# Patient Record
Sex: FEMALE | Race: WHITE | Employment: OTHER | URBAN - METROPOLITAN AREA
[De-identification: names, ages, dates, MRNs, and addresses within clinical notes are randomized per-mention and may not be internally consistent; named-entity substitution may affect disease eponyms.]

---

## 2017-05-30 PROBLEM — I25.10 CORONARY ARTERY DISEASE INVOLVING NATIVE CORONARY ARTERY OF NATIVE HEART WITHOUT ANGINA PECTORIS: Status: ACTIVE | Noted: 2017-05-30

## 2017-05-30 PROBLEM — E66.9 OBESITY (BMI 30-39.9): Status: ACTIVE | Noted: 2017-05-30

## 2018-02-13 PROBLEM — M79.89 LIMB SWELLING: Status: ACTIVE | Noted: 2018-02-13

## 2018-05-31 PROBLEM — R94.31 ABNORMAL EKG: Status: ACTIVE | Noted: 2018-05-31

## 2018-05-31 PROBLEM — E66.09 CLASS 1 OBESITY DUE TO EXCESS CALORIES WITHOUT SERIOUS COMORBIDITY WITH BODY MASS INDEX (BMI) OF 33.0 TO 33.9 IN ADULT: Status: ACTIVE | Noted: 2018-05-31

## 2018-08-02 PROBLEM — M1A.09X0 IDIOPATHIC CHRONIC GOUT OF MULTIPLE SITES WITHOUT TOPHUS: Status: ACTIVE | Noted: 2018-08-02

## 2019-02-18 PROCEDURE — 88175 CYTOPATH C/V AUTO FLUID REDO: CPT | Performed by: OBSTETRICS & GYNECOLOGY

## 2019-05-13 ENCOUNTER — OFFICE VISIT (OUTPATIENT)
Dept: SURGERY | Facility: CLINIC | Age: 79
End: 2019-05-13

## 2019-05-13 VITALS
WEIGHT: 197 LBS | HEIGHT: 63 IN | SYSTOLIC BLOOD PRESSURE: 125 MMHG | BODY MASS INDEX: 34.91 KG/M2 | TEMPERATURE: 98 F | HEART RATE: 84 BPM | DIASTOLIC BLOOD PRESSURE: 85 MMHG

## 2019-05-13 DIAGNOSIS — K62.89 PROCTITIS: Primary | ICD-10-CM

## 2019-05-13 DIAGNOSIS — Z98.0 HISTORY OF INTESTINAL BYPASS: ICD-10-CM

## 2019-05-13 DIAGNOSIS — L72.3 INFLAMED SEBACEOUS CYST: ICD-10-CM

## 2019-05-13 DIAGNOSIS — K62.89 ANAL PAIN: ICD-10-CM

## 2019-05-13 PROCEDURE — 99214 OFFICE O/P EST MOD 30 MIN: CPT | Performed by: COLON & RECTAL SURGERY

## 2019-05-13 PROCEDURE — 46600 DIAGNOSTIC ANOSCOPY SPX: CPT | Performed by: COLON & RECTAL SURGERY

## 2019-05-13 RX ORDER — CEFADROXIL 500 MG/1
500 CAPSULE ORAL 2 TIMES DAILY
Qty: 20 CAPSULE | Refills: 0 | Status: SHIPPED | OUTPATIENT
Start: 2019-05-13 | End: 2019-05-23

## 2019-05-13 NOTE — PROGRESS NOTES
Follow Up Visit Note       Active Problems      1. Proctitis    2. Inflamed sebaceous cyst    3. History of intestinal bypass    4.  Anal pain          Chief Complaint   Patient presents with:  Fistula: New/est. pt. believes she has a fistula - onset April gout and kidney stones. Surgical history is otherwise significant for appendectomy, cholecystectomy, and ventral hernia repair with mesh. I acted as a scribe in this encounter.      The physician obtained a history, independently performed a physical Problem Relation Age of Onset   • Heart Disease Mother    • Stroke Mother    • Diabetes Mother    • Other (cervical cancer) Mother      Social History    Socioeconomic History      Marital status:       Spouse name: Not on file      Number of chil Systems   Constitutional: Negative for chills, diaphoresis, fatigue, fever and unexpected weight change. HENT: Negative for hearing loss, nosebleeds, sore throat and trouble swallowing.     Respiratory: Negative for apnea, cough, shortness of breath and w abdominal bruit, no ascites, no pulsatile midline mass and no mass. There is no splenomegaly or hepatomegaly. There is no tenderness.  There is no rigidity, no rebound, no guarding, no CVA tenderness, no tenderness at McBurney's point and negative Hardy's Right cervical: No superficial cervical, no deep cervical and no posterior cervical adenopathy present. Left cervical: No superficial cervical, no deep cervical and no posterior cervical adenopathy present.         Right: No inguinal and no suprac patient has a complex gastrointestinal/surgical history. In 1971, she underwent an unusual form of bypass which involved a jejunal to mid-transverse colonic anastomosis. She has only about 1 foot of functional small intestine.   This results in chronic mary defined types were placed in this encounter.       Imaging & Referrals   None    Follow Up  Return in about 3 weeks (around 6/3/2019) for follow up proctitis, infected sebaceous cyst.    Delfina Mckinney MD

## 2019-05-14 NOTE — PATIENT INSTRUCTIONS
The patient is a pleasant 35-year-old female who presents in consultation regarding anal pain and bleeding. She also has a secondary complaint of a boil in her groin.     The patient is known to me from a history of multiple gastrointestinal issues includi right posterior lateral position. There is a significant scar tissue at this location, but no inflammation or induration. No evidence of fistula. Digital rectal exam is performed. This does cause the patient discomfort.   No abnormal masses or anal to

## 2019-05-16 NOTE — PROCEDURES
Procedure:  Anoscopy    Surgeon: Karla Silva    Anesthesia: None    Findings: See the progress note attached for all findings    Operative Summary: The patient was placed in a prone position on the proctoscopy table, the hips were flexed in the jackknife p

## 2019-06-03 ENCOUNTER — OFFICE VISIT (OUTPATIENT)
Dept: SURGERY | Facility: CLINIC | Age: 79
End: 2019-06-03
Payer: COMMERCIAL

## 2019-06-03 VITALS
WEIGHT: 189 LBS | HEIGHT: 63 IN | DIASTOLIC BLOOD PRESSURE: 68 MMHG | HEART RATE: 71 BPM | TEMPERATURE: 98 F | BODY MASS INDEX: 33.49 KG/M2 | SYSTOLIC BLOOD PRESSURE: 151 MMHG

## 2019-06-03 DIAGNOSIS — K62.89 ANAL PAIN: Primary | ICD-10-CM

## 2019-06-03 DIAGNOSIS — Z98.0 HISTORY OF INTESTINAL BYPASS: ICD-10-CM

## 2019-06-03 DIAGNOSIS — K60.1 CHRONIC POSTERIOR ANAL FISSURE: ICD-10-CM

## 2019-06-03 DIAGNOSIS — K60.1 CHRONIC ANTERIOR ANAL FISSURE: ICD-10-CM

## 2019-06-03 DIAGNOSIS — K64.4 EXTERNAL PROLAPSED HEMORRHOIDS: ICD-10-CM

## 2019-06-03 PROCEDURE — 99214 OFFICE O/P EST MOD 30 MIN: CPT | Performed by: COLON & RECTAL SURGERY

## 2019-06-03 NOTE — PROGRESS NOTES
Follow Up Visit Note       Active Problems      1. Anal pain    2. Chronic anterior anal fissure    3. Chronic posterior anal fissure    4. External prolapsed hemorrhoids    5.  History of intestinal bypass          Chief Complaint   Patient presents with: anastomosis. She remains with only approximately 1 foot of functional small intestine. The patient is up-to-date on colonoscopy. I have reviewed the patient's 3 prior colonoscopy reports.   In 2010 the patient did have scarring to the anal canal.  She allergies    • Kidney stones    • LBBB (left bundle branch block)    • Presbycusis of both ears 2004     Past Surgical History:   Procedure Laterality Date   • APPENDECTOMY  1971   • CHOLECYSTECTOMY  2003   • COLONOSCOPY  10/7/2010   • D & C     • DRAINAGE Oral Tab, TAKE 1 TABLET BY MOUTH EVERY DAY AS DIRECTED, Disp: 90 tablet, Rfl: 3  •  Pantoprazole Sodium 40 MG Oral Tab EC, Take 1 tablet (40 mg total) by mouth every morning before breakfast., Disp: 90 tablet, Rfl: 3  •  Montelukast Sodium 10 MG Oral Tab, Pulmonary/Chest: Effort normal and breath sounds normal. No respiratory distress. Abdominal: Soft. Bowel sounds are normal. She exhibits no distension. There is no tenderness.    Genitourinary: Vagina normal. Rectal exam shows no external hemorrhoid, no suppositories twice daily. Occasionally she does not need the second suppository in the evening. The patient states that she is doing much better. She has no complaints at this time. She reports that the swelling at the anus has completely resolved. to 4 weeks. The patient may then taper off of these as tolerated. The patient will require repeat colonoscopy in 2026.   At this time I have no further follow-up scheduled with the patient but if she has any further questions or concerns she may contact

## 2019-06-03 NOTE — PATIENT INSTRUCTIONS
This patient presents for further evaluation and treatment regarding anal pain and anal bleeding.     The patient is known to me from a history of multiple gastrointestinal issues including gastroesophageal reflux disease, hemorrhoids, pruritus ani, and ramonita examination of the anus reveals mixed external and internal hemorrhoids in the right anterior lateral, right posterior lateral, and left posterior lateral positions. There is no external evidence of pruritus ani. Digital rectal exam was performed.   There

## 2021-02-18 PROBLEM — I42.9 CARDIOMYOPATHY, UNSPECIFIED TYPE (HCC): Status: ACTIVE | Noted: 2021-02-18

## 2023-07-11 ENCOUNTER — HOSPITAL ENCOUNTER (EMERGENCY)
Age: 83
Discharge: HOME | End: 2023-07-11
Attending: EMERGENCY MEDICINE
Payer: MEDICARE

## 2023-07-11 VITALS
BODY MASS INDEX: 27.11 KG/M2 | DIASTOLIC BLOOD PRESSURE: 64 MMHG | WEIGHT: 153 LBS | RESPIRATION RATE: 18 BRPM | TEMPERATURE: 98 F | HEIGHT: 63 IN | OXYGEN SATURATION: 96 % | SYSTOLIC BLOOD PRESSURE: 134 MMHG | HEART RATE: 71 BPM

## 2023-07-11 DIAGNOSIS — S51.811A SKIN TEAR OF RIGHT FOREARM WITHOUT COMPLICATION, INITIAL ENCOUNTER: ICD-10-CM

## 2023-07-11 DIAGNOSIS — S51.811A LACERATION OF RIGHT FOREARM, INITIAL ENCOUNTER: Primary | ICD-10-CM

## 2023-07-11 PROCEDURE — 12002 RPR S/N/AX/GEN/TRNK2.6-7.5CM: CPT

## 2023-07-11 PROCEDURE — 99284 EMERGENCY DEPT VISIT MOD MDM: CPT

## 2023-07-11 PROCEDURE — 90471 IMMUNIZATION ADMIN: CPT

## 2023-07-11 PROCEDURE — 99283 EMERGENCY DEPT VISIT LOW MDM: CPT

## 2023-07-11 NOTE — ED INITIAL ASSESSMENT (HPI)
Pt states she tripped and fell in a bush with skin tear to rt forearm. Ems evaluated and applied dressing.

## 2023-07-11 NOTE — DISCHARGE INSTRUCTIONS
Keep the area dry for the next 24 hours. Apply triple antibiotic ointment daily for the first 48 hours after sutures are placed. Keep the area covered with a bandage for the first 48 hours. You may let water run over the area 24 hours after sutures are placed. Do not submerge the area in water until sutures are removed. Do not scrub the area. The wound may be left to open air after the first 2 days when you are at home and not in danger of infecting the wound. It is recommended to keep the wound covered at work and while sleeping. Otherwise it may be left to open air for ventilation. Sutures should be removed in 7 to 10 days.

## 2023-07-21 ENCOUNTER — HOSPITAL ENCOUNTER (EMERGENCY)
Age: 83
Discharge: HOME | End: 2023-07-21
Payer: MEDICARE

## 2023-07-21 VITALS
HEART RATE: 68 BPM | TEMPERATURE: 98 F | SYSTOLIC BLOOD PRESSURE: 139 MMHG | HEIGHT: 62 IN | DIASTOLIC BLOOD PRESSURE: 66 MMHG | BODY MASS INDEX: 28.16 KG/M2 | OXYGEN SATURATION: 97 % | WEIGHT: 153 LBS | RESPIRATION RATE: 16 BRPM

## 2023-07-21 DIAGNOSIS — Z48.02 ENCOUNTER FOR REMOVAL OF SUTURES: Primary | ICD-10-CM

## 2023-07-21 DIAGNOSIS — S51.811D LACERATION OF RIGHT FOREARM, SUBSEQUENT ENCOUNTER: ICD-10-CM

## 2023-07-21 NOTE — DISCHARGE INSTRUCTIONS
Keep thin layer of Vaseline or Aquaphor over the scab to soften it. Follow up with your primary doctor. If you have new, changing or worsening symptoms, please go directly to the ER.

## (undated) NOTE — LETTER
19    Patient: Akash Gomez  : 1940 Visit date: 6/3/2019    Dear  Dr. Eden Turner MD,    Thank you for referring Yoaninge Patricia to my practice. Please find my assessment and plan below.       Assessment   Anal pain  (primary encounter The patient is up-to-date on colonoscopy. I have reviewed the patient's 3 prior colonoscopy reports. In 2010 the patient did have scarring to the anal canal.  She underwent a colonoscopy in 2014 and most recently in September 2016.   On her most recent co